# Patient Record
(demographics unavailable — no encounter records)

---
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
## 2024-11-14 NOTE — PHYSICAL EXAM
[de-identified] : Examination of the left hip is as follows: INSPECTION: well healed incision, no swelling, no erythema, no palpable masses, no ecchymosis PALPATION: no ttp over groin ROM: flexion 120 degrees, abduction 60 degrees, external rotation 60 degrees, internal rotation 40 degrees, but extension 25 degrees, adduction 30 degrees STRENGTH: flexion 5/5, extension 5/5, abduction 5/5, adduction 5/5 VASCULAR: dorsalis pedis pulse: 1+, posterior tibialis pulse: 1+ NEURO: motor exam 5/5 throughout, light touch intact throughout, no focal motor deficits GAIT: normal gait  X-rays of the left hip is as follows: Hip with pelvis 2 view in the anteroposterior, lateral views: s/p GENET, components well fixed and in proper position without evidence of hardware failure.
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
## 2025-05-15 NOTE — ASSESSMENT
[FreeTextEntry1] : 77 yo male presenting s/p left GENET on 5/20/24. Patient denies fever/chills/CP/SOB. X-rays demonstrating well positioned ortho hardware in acceptable position and alignment. Patient has no pain or complaints regarding the hip at this time.  Reports he is pleased with his surgical outcome. -Activities as tolerated, no restrictions -Discussed with patient that they need to take prophylactic pre-procedural abx prior to dental cleanings, colonoscopy, endoscopy for the rest of their life. Patient expressed understanding. -Rest, ice, compression, elevation, NSAIDs PRN for pain.  -All questions answered -F/u 1 year or PRN  The diagnosis was explained in detail. The potential non-surgical and surgical treatments were reviewed. The relative risks and benefits of each option were considered relative to the patients age, activity level, medical history, symptom severity and previously attempted treatments.   The patient was advised to consult with their primary medical provider prior to initiation of any new medications to reduce the risk of adverse effects specific to their long-term home medications and medical history.   The patient's current medications management of their orthopedic diagnosis was reviewed today:   1) We discussed a comprehensive treatment plan that included possible pharmaceutical management involving the use of prescription strength medications including but not limited to options as oral Naprosyn 500mg BID, once daily Meloxicam 15 mg, or 500-650 mg Tylenol versus over-the-counter oral medications and topical prescriptions NSAID Pennsaid vs over the counter Voltaren gel.  2) There is a moderate risk of morbidity with further treatment, especially from use of prescription strength medications and possible side effects of these medications which include upset stomach with oral medications, skin reactions to topical medications and cardiac/renal issues with long term use.  3) I recommended that the patient follow-up with their medical physician to discuss any significant specific potential issues with long term medication use such as interactions with current medications or with exacerbation of underlying medical comorbidities.  4) The benefits and risks associated with use of injectable, oral or topical, prescription and over the counter anti-inflammatory medications were discussed with the patient. The patient voiced understanding of the risks including but not limited to bleeding, stroke, kidney dysfunction, heart disease, and were referred to the black box warning label for further information  Entered by Marquise Walsh PA-C acting as scribe. Dr. Middleton Attestation The documentation recorded by the scribe, in my presence, accurately reflects the service I, Dr. Middleton, personally performed, and the decisions made by me with my edits as appropriate.

## 2025-05-15 NOTE — PHYSICAL EXAM
[de-identified] : Examination of the left hip is as follows: INSPECTION: well healed incision, no swelling, no erythema, no palpable masses, no ecchymosis PALPATION: no ttp over groin, no greater trochanter tenderness ROM: flexion 120 degrees, abduction 60 degrees, external rotation 35 degrees, internal rotation 40 degrees, but extension 25 degrees, adduction 30 degrees STRENGTH: flexion 5/5, extension 5/5, abduction 5/5, adduction 5/5 VASCULAR: dorsalis pedis pulse: 1+, posterior tibialis pulse: 1+ NEURO: motor exam 5/5 throughout, light touch intact throughout, no focal motor deficits GAIT: normal gait  X-rays of the left hip is as follows: Hip with pelvis 2 view in the anteroposterior, lateral views: s/p GENET, components well fixed and in proper position without evidence of hardware failure.